# Patient Record
Sex: MALE | Race: OTHER | Employment: OTHER | URBAN - METROPOLITAN AREA
[De-identification: names, ages, dates, MRNs, and addresses within clinical notes are randomized per-mention and may not be internally consistent; named-entity substitution may affect disease eponyms.]

---

## 2022-05-18 ENCOUNTER — NEW PATIENT (OUTPATIENT)
Dept: URBAN - METROPOLITAN AREA CLINIC 39 | Facility: CLINIC | Age: 52
End: 2022-05-18

## 2022-05-18 DIAGNOSIS — H40.1133: ICD-10-CM

## 2022-05-18 DIAGNOSIS — H25.813: ICD-10-CM

## 2022-05-18 DIAGNOSIS — H04.123: ICD-10-CM

## 2022-05-18 PROCEDURE — 92020 GONIOSCOPY: CPT

## 2022-05-18 PROCEDURE — 92004 COMPRE OPH EXAM NEW PT 1/>: CPT

## 2022-05-18 PROCEDURE — 92250 FUNDUS PHOTOGRAPHY W/I&R: CPT

## 2022-05-18 PROCEDURE — 76514 ECHO EXAM OF EYE THICKNESS: CPT

## 2022-05-18 ASSESSMENT — TONOMETRY
OS_IOP_MMHG: 12
OD_IOP_MMHG: 12

## 2022-05-18 ASSESSMENT — VISUAL ACUITY
OS_SC: 20/200
OS_CC: J3
OD_CC: J3
OS_SC: J1
OD_SC: 20/100
OD_CC: 20/20-2
OS_CC: 20/20
OD_SC: J1

## 2022-05-18 ASSESSMENT — PACHYMETRY
OD_CT_UM: 562
OS_CT_UM: 570

## 2022-05-18 NOTE — PATIENT DISCUSSION
IOP IN TARGET RANGE ON SIMBRINZA AND LUMIGAN.  WILL REQUEST OUTSIDE RECORDS.  TC ADDING SLT FOR EXTRA PROTECTION NEXT VISIT.

## 2022-08-15 ENCOUNTER — FOLLOW UP (OUTPATIENT)
Dept: URBAN - METROPOLITAN AREA CLINIC 39 | Facility: CLINIC | Age: 52
End: 2022-08-15

## 2022-08-15 DIAGNOSIS — H40.1133: ICD-10-CM

## 2022-08-15 PROCEDURE — 92012 INTRM OPH EXAM EST PATIENT: CPT

## 2022-08-15 PROCEDURE — 92133 CPTRZD OPH DX IMG PST SGM ON: CPT

## 2022-08-15 PROCEDURE — 92202 OPSCPY EXTND ON/MAC DRAW: CPT

## 2022-08-15 ASSESSMENT — VISUAL ACUITY
OS_CC: 20/25+1
OD_CC: 20/20-2

## 2022-08-15 ASSESSMENT — TONOMETRY
OS_IOP_MMHG: 11
OD_IOP_MMHG: 12

## 2023-01-30 ENCOUNTER — FOLLOW UP (OUTPATIENT)
Dept: URBAN - METROPOLITAN AREA CLINIC 39 | Facility: CLINIC | Age: 53
End: 2023-01-30

## 2023-01-30 DIAGNOSIS — H40.1133: ICD-10-CM

## 2023-01-30 PROCEDURE — 92012 INTRM OPH EXAM EST PATIENT: CPT

## 2023-01-30 PROCEDURE — 92083 EXTENDED VISUAL FIELD XM: CPT

## 2023-01-30 ASSESSMENT — VISUAL ACUITY
OS_CC: 20/20
OD_CC: 20/20

## 2023-01-30 ASSESSMENT — TONOMETRY
OD_IOP_MMHG: 11
OS_IOP_MMHG: 10

## 2023-01-30 NOTE — PATIENT DISCUSSION
NEW BASELINE VF 10-2 OU TODAY COMPARED TO OUTSIDE.  PT EXPRESSED MORE DIFFICULTY TAKING OS.   IOP IN TARGET RANGE.  CONTINUE CURRENT MANAGEMENT AT THIS TIME.  DISCUSSED REPEATING SLT OU.  WILL SCHEDULE.

## 2023-07-10 ENCOUNTER — COMPREHENSIVE EXAM (OUTPATIENT)
Dept: URBAN - METROPOLITAN AREA CLINIC 39 | Facility: CLINIC | Age: 53
End: 2023-07-10

## 2023-07-10 DIAGNOSIS — H40.1133: ICD-10-CM

## 2023-07-10 PROCEDURE — 92014 COMPRE OPH EXAM EST PT 1/>: CPT

## 2023-07-10 PROCEDURE — 92250 FUNDUS PHOTOGRAPHY W/I&R: CPT

## 2023-07-10 ASSESSMENT — VISUAL ACUITY
OS_CC: 20/20
OD_CC: 20/25

## 2023-07-10 ASSESSMENT — TONOMETRY
OS_IOP_MMHG: 09
OD_IOP_MMHG: 08

## 2023-12-20 ENCOUNTER — FOLLOW UP (OUTPATIENT)
Dept: URBAN - METROPOLITAN AREA CLINIC 39 | Facility: CLINIC | Age: 53
End: 2023-12-20

## 2023-12-20 DIAGNOSIS — H25.813: ICD-10-CM

## 2023-12-20 DIAGNOSIS — H40.1133: ICD-10-CM

## 2023-12-20 DIAGNOSIS — H04.123: ICD-10-CM

## 2023-12-20 PROCEDURE — 92012 INTRM OPH EXAM EST PATIENT: CPT

## 2023-12-20 PROCEDURE — 92133 CPTRZD OPH DX IMG PST SGM ON: CPT

## 2023-12-20 PROCEDURE — 92202 OPSCPY EXTND ON/MAC DRAW: CPT

## 2023-12-20 ASSESSMENT — VISUAL ACUITY
OD_CC: 20/25-1
OS_CC: 20/20-2

## 2023-12-20 ASSESSMENT — TONOMETRY
OS_IOP_MMHG: 08
OD_IOP_MMHG: 09

## 2024-07-08 ENCOUNTER — FOLLOW UP (OUTPATIENT)
Dept: URBAN - METROPOLITAN AREA CLINIC 39 | Facility: CLINIC | Age: 54
End: 2024-07-08

## 2024-07-08 DIAGNOSIS — H40.1133: ICD-10-CM

## 2024-07-08 PROCEDURE — 92012 INTRM OPH EXAM EST PATIENT: CPT

## 2024-07-08 PROCEDURE — 92083 EXTENDED VISUAL FIELD XM: CPT

## 2024-07-08 ASSESSMENT — VISUAL ACUITY
OD_CC: 20/25-1
OS_CC: 20/20

## 2024-07-08 ASSESSMENT — TONOMETRY
OD_IOP_MMHG: 07
OS_IOP_MMHG: 07

## 2025-01-06 ENCOUNTER — COMPREHENSIVE EXAM (OUTPATIENT)
Age: 55
End: 2025-01-06

## 2025-01-06 DIAGNOSIS — H40.1133: ICD-10-CM

## 2025-01-06 PROCEDURE — 92250 FUNDUS PHOTOGRAPHY W/I&R: CPT

## 2025-01-06 PROCEDURE — 92014 COMPRE OPH EXAM EST PT 1/>: CPT

## 2025-04-16 ENCOUNTER — CONSULTATION/EVALUATION (OUTPATIENT)
Age: 55
End: 2025-04-16

## 2025-04-16 DIAGNOSIS — H40.1133: ICD-10-CM

## 2025-04-16 DIAGNOSIS — H25.813: ICD-10-CM

## 2025-04-16 PROCEDURE — 92004 COMPRE OPH EXAM NEW PT 1/>: CPT

## 2025-04-16 PROCEDURE — 92025-3 CORNEAL TOPO, REFUSED

## 2025-04-16 PROCEDURE — 92136 OPHTHALMIC BIOMETRY: CPT

## 2025-05-06 ENCOUNTER — PRE-OP/H&P (OUTPATIENT)
Age: 55
End: 2025-05-06

## 2025-05-06 ENCOUNTER — SURGERY/PROCEDURE (OUTPATIENT)
Age: 55
End: 2025-05-06

## 2025-05-06 DIAGNOSIS — H25.813: ICD-10-CM

## 2025-05-06 DIAGNOSIS — H27.8: ICD-10-CM

## 2025-05-06 DIAGNOSIS — H40.1123: ICD-10-CM

## 2025-05-06 DIAGNOSIS — H25.812: ICD-10-CM

## 2025-05-06 PROCEDURE — 99211HP PRE-OP

## 2025-05-06 PROCEDURE — 65820 GONIOTOMY: CPT

## 2025-05-06 PROCEDURE — 66984 XCAPSL CTRC RMVL W/O ECP: CPT

## 2025-05-07 ENCOUNTER — POST-OP (OUTPATIENT)
Age: 55
End: 2025-05-07

## 2025-05-07 DIAGNOSIS — Z96.1: ICD-10-CM

## 2025-05-07 PROCEDURE — 99024 POSTOP FOLLOW-UP VISIT: CPT

## 2025-05-12 ENCOUNTER — SURGERY/PROCEDURE (OUTPATIENT)
Age: 55
End: 2025-05-12

## 2025-05-12 ENCOUNTER — PRE-OP/H&P (OUTPATIENT)
Age: 55
End: 2025-05-12

## 2025-05-12 DIAGNOSIS — H25.811: ICD-10-CM

## 2025-05-12 DIAGNOSIS — Z96.1: ICD-10-CM

## 2025-05-12 DIAGNOSIS — H40.1113: ICD-10-CM

## 2025-05-12 PROCEDURE — 99211HP PRE-OP

## 2025-05-12 PROCEDURE — 65820 GONIOTOMY: CPT | Mod: 79,RT

## 2025-05-12 PROCEDURE — 66984 XCAPSL CTRC RMVL W/O ECP: CPT | Mod: 79,RT

## 2025-05-13 ENCOUNTER — POST-OP (OUTPATIENT)
Age: 55
End: 2025-05-13

## 2025-05-13 DIAGNOSIS — Z96.1: ICD-10-CM

## 2025-05-13 PROCEDURE — 99024 POSTOP FOLLOW-UP VISIT: CPT

## 2025-05-21 ENCOUNTER — POST-OP (OUTPATIENT)
Age: 55
End: 2025-05-21

## 2025-05-21 DIAGNOSIS — Z96.1: ICD-10-CM

## 2025-05-21 PROCEDURE — 99024 POSTOP FOLLOW-UP VISIT: CPT

## 2025-06-30 ENCOUNTER — POST-OP (OUTPATIENT)
Age: 55
End: 2025-06-30

## 2025-06-30 DIAGNOSIS — Z96.1: ICD-10-CM

## 2025-06-30 PROCEDURE — 99024 POSTOP FOLLOW-UP VISIT: CPT

## 2025-07-31 ENCOUNTER — COMPREHENSIVE EXAM (OUTPATIENT)
Age: 55
End: 2025-07-31

## 2025-07-31 DIAGNOSIS — H26.493: ICD-10-CM

## 2025-07-31 DIAGNOSIS — Z96.1: ICD-10-CM

## 2025-07-31 DIAGNOSIS — H04.123: ICD-10-CM

## 2025-07-31 DIAGNOSIS — H40.1133: ICD-10-CM

## 2025-07-31 DIAGNOSIS — H02.88B: ICD-10-CM

## 2025-07-31 DIAGNOSIS — H02.88A: ICD-10-CM

## 2025-07-31 PROCEDURE — 92014 COMPRE OPH EXAM EST PT 1/>: CPT

## 2025-07-31 PROCEDURE — 92015 DETERMINE REFRACTIVE STATE: CPT

## 2025-07-31 PROCEDURE — 92133 CPTRZD OPH DX IMG PST SGM ON: CPT
